# Patient Record
Sex: FEMALE | Race: WHITE
[De-identification: names, ages, dates, MRNs, and addresses within clinical notes are randomized per-mention and may not be internally consistent; named-entity substitution may affect disease eponyms.]

---

## 2017-03-06 ENCOUNTER — APPOINTMENT (OUTPATIENT)
Dept: OPHTHALMOLOGY | Facility: CLINIC | Age: 72
End: 2017-03-06

## 2018-01-08 ENCOUNTER — RESULT REVIEW (OUTPATIENT)
Age: 73
End: 2018-01-08

## 2021-12-13 ENCOUNTER — APPOINTMENT (OUTPATIENT)
Dept: PSYCHIATRY | Facility: CLINIC | Age: 76
End: 2021-12-13
Payer: MEDICARE

## 2021-12-13 PROCEDURE — 99205 OFFICE O/P NEW HI 60 MIN: CPT

## 2021-12-13 RX ORDER — LAMOTRIGINE 200 MG/1
200 TABLET, FILM COATED, EXTENDED RELEASE ORAL
Qty: 90 | Refills: 0 | Status: ACTIVE | COMMUNITY
Start: 2021-06-13

## 2021-12-13 RX ORDER — LEVETIRACETAM 750 MG/1
750 TABLET, FILM COATED, EXTENDED RELEASE ORAL
Qty: 90 | Refills: 0 | Status: ACTIVE | COMMUNITY
Start: 2021-10-21

## 2021-12-13 RX ORDER — ATORVASTATIN CALCIUM 20 MG/1
20 TABLET, FILM COATED ORAL
Qty: 90 | Refills: 0 | Status: ACTIVE | COMMUNITY
Start: 2021-07-27

## 2021-12-13 RX ORDER — AMOXICILLIN 500 MG/1
500 CAPSULE ORAL
Qty: 21 | Refills: 0 | Status: DISCONTINUED | COMMUNITY
Start: 2021-08-02

## 2021-12-14 ENCOUNTER — TRANSCRIPTION ENCOUNTER (OUTPATIENT)
Age: 76
End: 2021-12-14

## 2022-01-03 ENCOUNTER — APPOINTMENT (OUTPATIENT)
Dept: PSYCHIATRY | Facility: CLINIC | Age: 77
End: 2022-01-03
Payer: MEDICARE

## 2022-01-03 PROCEDURE — 99442: CPT | Mod: 95

## 2022-01-24 ENCOUNTER — APPOINTMENT (OUTPATIENT)
Dept: PSYCHIATRY | Facility: CLINIC | Age: 77
End: 2022-01-24
Payer: MEDICARE

## 2022-01-24 PROCEDURE — 99214 OFFICE O/P EST MOD 30 MIN: CPT | Mod: 95

## 2022-03-15 ENCOUNTER — APPOINTMENT (OUTPATIENT)
Dept: PSYCHIATRY | Facility: CLINIC | Age: 77
End: 2022-03-15
Payer: MEDICARE

## 2022-03-15 PROCEDURE — 99214 OFFICE O/P EST MOD 30 MIN: CPT | Mod: 95

## 2022-07-06 ENCOUNTER — APPOINTMENT (OUTPATIENT)
Dept: PSYCHIATRY | Facility: CLINIC | Age: 77
End: 2022-07-06

## 2022-07-06 PROCEDURE — 99214 OFFICE O/P EST MOD 30 MIN: CPT | Mod: 95

## 2022-07-06 RX ORDER — KETOCONAZOLE 20 MG/G
2 CREAM TOPICAL
Qty: 60 | Refills: 0 | Status: DISCONTINUED | COMMUNITY
Start: 2022-03-16

## 2022-07-06 RX ORDER — METOPROLOL SUCCINATE 50 MG/1
50 TABLET, EXTENDED RELEASE ORAL
Qty: 90 | Refills: 0 | Status: ACTIVE | COMMUNITY
Start: 2022-07-02

## 2022-07-06 RX ORDER — HYDROCORTISONE 25 MG/G
2.5 CREAM TOPICAL
Qty: 28 | Refills: 0 | Status: DISCONTINUED | COMMUNITY
Start: 2022-03-16

## 2022-07-06 RX ORDER — TRIAMCINOLONE ACETONIDE 1 MG/G
0.1 CREAM TOPICAL
Qty: 45 | Refills: 0 | Status: DISCONTINUED | COMMUNITY
Start: 2022-05-03

## 2022-10-11 ENCOUNTER — APPOINTMENT (OUTPATIENT)
Dept: PSYCHIATRY | Facility: CLINIC | Age: 77
End: 2022-10-11

## 2022-10-11 PROCEDURE — 99214 OFFICE O/P EST MOD 30 MIN: CPT | Mod: 95

## 2022-11-05 NOTE — PLAN
[No] : No [Medication education provided] : Medication education provided. [FreeTextEntry5] : Psychoeducation and supportive therapy provided, and discussed rationale for recommended meds, continue to monitor for sustained stability of improved symptoms \par sleep hygiene education- no TV or computer/blue screen in the middle of the night, advise get up a try method with listening to soft music or reading a book with task lighting. \par Medication: Continue venlafaxine XR to 75 mg+37.5 mg/day\par Xanax 0.25 mg once a day prn for severe anxiety (states she did not need Xanax since last visit) \par Side effects including but not limited to GI side effects and black box warning of SI in patients younger than 24 were discussed and elevated HBP ate higher doses discussed \par BZD side effects: Discussed with patient adverse effects of longer term/frequent use of BZD, potential to develop tolerance to the dose effects and develop dependence, and potential for addiction. Advise patient not to drive or operate heavy machinery immediately after taking the medication. Also educated patient about safe use/and keeping meds safely, and to not share medications with family/friends. \par MiraLAX daily for constipation, prune juice for constipation\par Educated patient of importance of remaining abstinent from drugs and alcohol. \par Emergency procedures were discussed: pt. educated to call 911 or go to nearest ER for worsening of symptoms/suicidal/homicidal ideation. \par Recommend continuing individual psychotherapy to learn coping skills (outside) \par RTC in 3 months or earlier as needed \par Patient given opportunity to ask questions and their questions were answered and they expressed understanding and agreement with above plan.\par \par I stop checked, no controlled substance Rx in past 12 months: Reference #: [id number]

## 2022-11-05 NOTE — DISCUSSION/SUMMARY
[FreeTextEntry1] : 77 yo woman with anxious predisposition reports sx consistent with diagnosis of generalized anxiety disorder and depressive disorder. She is on Prozac for 10 years, partial response, recently dose further lowered, presents to establish care and review meds. \par \par 1/3/2022: Patient reports no significant improvement or worsening of symptoms on venlafaxine, has history of constipation which she thinks is worse, agrees to take meds for constipation consistently and continue with the trial of venlafaxine, continue to monitor for adverse effects and improvement.\par \par 1/24/2022: Patient reports she feels calmer and has been also noted that then she feels about 75% improvement of symptoms on venlafaxine current dose than when she was taking Prozac.\par \par 3/15/2022: Patient reports sustained improvement of symptoms of depression and anxiety has mild side effects from venlafaxine which she reports are tolerable and could be related to food intake, will continue to monitor and continue with the same dose of medication.\par \par 7/6/2022: Patient reports sustained improvement in symptoms of depression and anxiety, no side effects from venlafaxine reported, continue to monitor for stability of symptoms and continue same medications.\par \par 10/11/2022: Patient and her  report that patient is continuing to do well and they feel that the symptoms of depression and anxiety are in good control on current medications\par \par

## 2022-11-05 NOTE — REASON FOR VISIT
[Patient with collateral] : Patient with collateral  [Spouse] : spouse [FreeTextEntry1] : depression and anxiety

## 2022-11-05 NOTE — HISTORY OF PRESENT ILLNESS
[Home] : at home, [unfilled] , at the time of the visit. [Medical Office: ([facility])___] : at the medical office located in  [Spouse] : spouse [Verbal consent obtained from patient] : the patient, [unfilled] [FreeTextEntry1] : Patient was seen with her  today. \par Patient reported that she is continuing to feel okay with fair control of anxiety and depression symptoms on current medications.  Patient reported that her appetite is okay and desire and motivation to do things is good.  Patient states she has chronic problems with sleep and generally is up at least 2 times in the middle of the night to use the bathroom and as often has trouble falling back to sleep so she plays scrabble on the computer and when she starts feeling drowsy is when she comes in and lays in bed.  Patient reported that she does get adequate hours of sleep although it is interrupted.  Patient reported that she does not feel tired during the day.  Patient denied feeling hopeless or helpless and denied passive or active SI.\par Patient's  corroborated patient's report and reported that he also thinks she is doing well.

## 2022-12-29 ENCOUNTER — OFFICE (OUTPATIENT)
Dept: URBAN - METROPOLITAN AREA CLINIC 109 | Facility: CLINIC | Age: 77
Setting detail: OPHTHALMOLOGY
End: 2022-12-29
Payer: MEDICARE

## 2022-12-29 DIAGNOSIS — H53.16: ICD-10-CM

## 2022-12-29 DIAGNOSIS — H26.493: ICD-10-CM

## 2022-12-29 DIAGNOSIS — H43.393: ICD-10-CM

## 2022-12-29 PROCEDURE — 92014 COMPRE OPH EXAM EST PT 1/>: CPT | Performed by: OPHTHALMOLOGY

## 2022-12-29 ASSESSMENT — REFRACTION_AUTOREFRACTION
OS_AXIS: 76
OD_CYLINDER: -1.00
OS_CYLINDER: -1.00
OD_AXIS: 81
OD_SPHERE: -0.25
OS_SPHERE: 0.00

## 2022-12-29 ASSESSMENT — REFRACTION_MANIFEST
OS_CYLINDER: -0.75
OD_CYLINDER: -0.25
OD_AXIS: 90
OS_VA1: 20/20-
OS_ADD: +2.50
OS_SPHERE: PL
OD_VA1: 20/25+
OS_AXIS: 75
OD_ADD: +2.50
OD_SPHERE: -1.00

## 2022-12-29 ASSESSMENT — REFRACTION_CURRENTRX
OS_OVR_VA: 20/
OD_VPRISM_DIRECTION: PROGS
OD_OVR_VA: 20/
OS_AXIS: 077
OD_SPHERE: -0.50
OD_ADD: +2.50
OS_AXIS: 75
OS_ADD: +2.50
OD_OVR_VA: 20/
OS_SPHERE: -0.25
OS_VPRISM_DIRECTION: PROGS
OS_SPHERE: PL
OS_OVR_VA: 20/
OS_CYLINDER: -0.75
OS_CYLINDER: -0.75
OS_ADD: +2.50
OD_CYLINDER: -0.25
OD_AXIS: 90
OD_AXIS: 095
OD_CYLINDER: -0.25
OD_SPHERE: -0.50
OD_ADD: +2.50

## 2022-12-29 ASSESSMENT — CONFRONTATIONAL VISUAL FIELD TEST (CVF)
OD_FINDINGS: FULL
OS_FINDINGS: FULL

## 2022-12-29 ASSESSMENT — SPHEQUIV_DERIVED
OS_SPHEQUIV: -0.5
OD_SPHEQUIV: -1.125
OD_SPHEQUIV: -0.75

## 2022-12-29 ASSESSMENT — VISUAL ACUITY
OS_BCVA: 20/20-1
OD_BCVA: 20/20

## 2022-12-29 ASSESSMENT — LID POSITION - PTOSIS
OS_PTOSIS: LUL 1+ 2+
OD_PTOSIS: 1+ 2+

## 2023-01-10 ENCOUNTER — APPOINTMENT (OUTPATIENT)
Dept: PSYCHIATRY | Facility: CLINIC | Age: 78
End: 2023-01-10
Payer: MEDICARE

## 2023-01-10 PROCEDURE — 99214 OFFICE O/P EST MOD 30 MIN: CPT | Mod: 95

## 2023-01-16 NOTE — DISCUSSION/SUMMARY
[FreeTextEntry1] : 75 yo woman with anxious predisposition reports sx consistent with diagnosis of generalized anxiety disorder and depressive disorder. She is on Prozac for 10 years, partial response, recently dose further lowered, presents to establish care and review meds. \par \par 1/3/2022: Patient reports no significant improvement or worsening of symptoms on venlafaxine, has history of constipation which she thinks is worse, agrees to take meds for constipation consistently and continue with the trial of venlafaxine, continue to monitor for adverse effects and improvement.\par \par 1/24/2022: Patient reports she feels calmer and has been also noted that then she feels about 75% improvement of symptoms on venlafaxine current dose than when she was taking Prozac.\par \par 3/15/2022: Patient reports sustained improvement of symptoms of depression and anxiety has mild side effects from venlafaxine which she reports are tolerable and could be related to food intake, will continue to monitor and continue with the same dose of medication.\par \par 7/6/2022: Patient reports sustained improvement in symptoms of depression and anxiety, no side effects from venlafaxine reported, continue to monitor for stability of symptoms and continue same medications.\par \par 10/11/2022: Patient and her  report that patient is continuing to do well and they feel that the symptoms of depression and anxiety are in good control on current medications\par \par 1/09/2023: The patient states she still has a few days in a  month that she feels depressed, mostly because she is not able to do things such as volunteer or socialize like before the pandemic, and she did not want to increase the medication dose and feels the current dose is keeping her symptoms in good control.

## 2023-01-16 NOTE — PHYSICAL EXAM
[None] : none [Average] : average [Cooperative] : cooperative [Euthymic] : euthymic [Full] : full [Clear] : clear [Linear/Goal Directed] : linear/goal directed [WNL] : within normal limits [None Reported] : none reported [de-identified] : No SI/HI

## 2023-01-16 NOTE — HISTORY OF PRESENT ILLNESS
[Home] : at home, [unfilled] , at the time of the visit. [Medical Office: ([facility])___] : at the medical office located in  [Verbal consent obtained from patient] : the patient, [unfilled] [FreeTextEntry1] : Patient reported that she was frustrated today, because it took her a while to figure out logging in to the telehealth. She states she still has some days when she is feeling depressed. She attributes this to not having activities that she used to do before the pandemic, such as volunteering at the hospital. She states she enjoyed it and is looking for other opportunities, but has felt disappointed because she has not heard back from some places that she had reached out enquiring about volunteering opportunities. \par Patient reported that her appetite is okay and desire and motivation to do things is good.  Patient states she there is no change in chronic problems with sleep, which is interrupted, but reported that she does get adequate hours of sleep although it is interrupted.  Patient reported that she does not feel tired during the day.  Patient denied feeling hopeless or helpless and denied passive or active SI.\par Patient's  corroborated patient's report and reported that he also thinks she is doing well.

## 2023-01-16 NOTE — PLAN
[No] : No [Medication education provided] : Medication education provided. [FreeTextEntry5] : Psychoeducation and supportive therapy provided, and discussed rationale for recommended meds, continue to monitor for sustained stability of improved symptoms \par sleep hygiene education\par Medication: Continue venlafaxine XR to 75 mg+37.5 mg/day\par Xanax 0.25 mg once a day prn for severe anxiety (states she did not need Xanax since last visit) \par Side effects including but not limited to GI side effects and black box warning of SI in patients younger than 24 were discussed and elevated HBP ate higher doses discussed \par BZD side effects: Discussed with patient adverse effects of longer term/frequent use of BZD, potential to develop tolerance to the dose effects and develop dependence, and potential for addiction. Advise patient not to drive or operate heavy machinery immediately after taking the medication. Also educated patient about safe use/and keeping meds safely, and to not share medications with family/friends. \par MiraLAX daily for constipation, prune juice for constipation\par Educated patient of importance of remaining abstinent from drugs and alcohol. \par Emergency procedures were discussed: pt. educated to call 911 or go to nearest ER for worsening of symptoms/suicidal/homicidal ideation. \par Recommend continuing individual psychotherapy to learn coping skills (outside) \par RTC in 3 months or earlier as needed \par Patient given opportunity to ask questions and their questions were answered and they expressed understanding and agreement with above plan.\par \par I stop checked, no controlled substance Rx in past 12 months: Reference #: [id number]

## 2023-04-10 ENCOUNTER — APPOINTMENT (OUTPATIENT)
Dept: PSYCHIATRY | Facility: CLINIC | Age: 78
End: 2023-04-10
Payer: MEDICARE

## 2023-04-10 PROCEDURE — 99214 OFFICE O/P EST MOD 30 MIN: CPT | Mod: 95

## 2023-04-11 NOTE — DISCUSSION/SUMMARY
[FreeTextEntry1] : 75 yo woman with anxious predisposition reports sx consistent with diagnosis of generalized anxiety disorder and depressive disorder. She is on Prozac for 10 years, partial response, recently dose further lowered, presents to establish care and review meds. \par \par 1/3/2022: Patient reports no significant improvement or worsening of symptoms on venlafaxine, has history of constipation which she thinks is worse, agrees to take meds for constipation consistently and continue with the trial of venlafaxine, continue to monitor for adverse effects and improvement.\par \par 1/24/2022: Patient reports she feels calmer and has been also noted that then she feels about 75% improvement of symptoms on venlafaxine current dose than when she was taking Prozac.\par \par 3/15/2022: Patient reports sustained improvement of symptoms of depression and anxiety has mild side effects from venlafaxine which she reports are tolerable and could be related to food intake, will continue to monitor and continue with the same dose of medication.\par \par 7/6/2022: Patient reports sustained improvement in symptoms of depression and anxiety, no side effects from venlafaxine reported, continue to monitor for stability of symptoms and continue same medications.\par \par 10/11/2022: Patient and her  report that patient is continuing to do well and they feel that the symptoms of depression and anxiety are in good control on current medications\par \par 1/09/2023: The patient states she still has a few days in a  month that she feels depressed, mostly because she is not able to do things such as volunteer or socialize like before the pandemic, and she did not want to increase the medication dose and feels the current dose is keeping her symptoms in good control. \par \par 4/10/2023: Patient remains stable with good control of anxiety and not depressed.

## 2023-04-11 NOTE — HISTORY OF PRESENT ILLNESS
[Home] : at home, [unfilled] , at the time of the visit. [Medical Office: ([facility])___] : at the medical office located in  [Verbal consent obtained from patient] : the patient, [unfilled] [FreeTextEntry1] : Patient reported she is doing okay. She reported sleep remains interrupted and she had home sleep yet, which was inconclusive. She states sporadically she is waking up with excess sweating. She reported Keppra dose was reduced, no seizures in 10 years. She reported not feeling depressed. Anxiety symptoms remain in good control. Patient reported that her appetite is okay and desire and motivation to do things is good.  Patient denied feeling hopeless or helpless and denied passive or active SI.\par Patient's  corroborated patient's report and reported that he also thinks she is doing well.\par Patient states that she would like to try and look for the venlafaxine dose to 75 mg daily.  Patient states she is not experiencing any side effects and feels that that she is doing better on the medications overall but would like to try a lower dose anyway.

## 2023-04-11 NOTE — PLAN
[No] : No [Medication education provided] : Medication education provided. [Rationale for medication choices, possible risks/precautions, benefits, alternative treatment choices, and consequences of non-treatment discussed] : Rationale for medication choices, possible risks/precautions, benefits, alternative treatment choices, and consequences of non-treatment discussed with patient/family/caregiver  [FreeTextEntry5] : Psychoeducation and supportive therapy provided, and discussed rationale for recommended meds, and risk versus benefit of lowering the dose and increased risk of relapse or losing the benefit of the effectiveness at a lower dose.  Patient agreed to continue taking venlafaxine .5 mg daily.\par sleep hygiene education\par Medication: Continue venlafaxine XR to 75 mg+37.5 mg/day\par Xanax 0.25 mg once a day prn for severe anxiety (states she did not need Xanax since last visit) \par Side effects including but not limited to GI side effects and black box warning of SI in patients younger than 24 were discussed and elevated HBP ate higher doses discussed \par BZD side effects: Discussed with patient adverse effects of longer term/frequent use of BZD, potential to develop tolerance to the dose effects and develop dependence, and potential for addiction. Advise patient not to drive or operate heavy machinery immediately after taking the medication. Also educated patient about safe use/and keeping meds safely, and to not share medications with family/friends. \par MiraLAX daily for constipation, prune juice for constipation\par Educated patient of importance of remaining abstinent from drugs and alcohol. \par Emergency procedures were discussed: pt. educated to call 911 or go to nearest ER for worsening of symptoms/suicidal/homicidal ideation. \par Recommend continuing individual psychotherapy to learn coping skills (outside) \par RTC in 3 months or earlier as needed \par Patient not seen for an in person visit by this provider after  12/2/2021 visit. The patient was informed of the need to be seen for in-person visit to avoid issues with getting controlled substance prescription coverage from other providers in the practice after the PHP and on May 11.  Patient has been receiving prescription for alprazolam from her neurologist and at this point she does not need a refill and states that if she is unable to come in for an in-person visit she will continue to receive prescriptions for alprazolam from her neurologist.  \par \par \par Patient given opportunity to ask questions and their questions were answered and they expressed understanding and agreement with above plan.\par \par I stop checked today: Reference #: [id number]\par Rx Written	Rx Dispensed	Drug	Quantity	Days Supply	Prescriber Name	Prescriber Gwendolyn #	Payment Method	Dispenser\par 10/29/2022	11/02/2022	alprazolam 0.25 mg tablet	15	7	Irma Lucia MD	[id number]	Medicare	[id number]

## 2023-04-11 NOTE — PHYSICAL EXAM
[None] : none [Average] : average [Cooperative] : cooperative [Euthymic] : euthymic [Full] : full [Clear] : clear [Linear/Goal Directed] : linear/goal directed [None Reported] : none reported [WNL] : within normal limits [de-identified] : No SI/HI

## 2023-07-07 ENCOUNTER — APPOINTMENT (OUTPATIENT)
Dept: PSYCHIATRY | Facility: CLINIC | Age: 78
End: 2023-07-07
Payer: MEDICARE

## 2023-07-07 PROCEDURE — 99214 OFFICE O/P EST MOD 30 MIN: CPT | Mod: 95

## 2023-07-09 NOTE — DISCUSSION/SUMMARY
[FreeTextEntry1] : 75 yo woman with anxious predisposition reports sx consistent with diagnosis of generalized anxiety disorder and depressive disorder. She is on Prozac for 10 years, partial response, recently dose further lowered, presents to establish care and review meds. \par \par 1/3/2022: Patient reports no significant improvement or worsening of symptoms on venlafaxine, has history of constipation which she thinks is worse, agrees to take meds for constipation consistently and continue with the trial of venlafaxine, continue to monitor for adverse effects and improvement.\par \par 1/24/2022: Patient reports she feels calmer and has been also noted that then she feels about 75% improvement of symptoms on venlafaxine current dose than when she was taking Prozac.\par \par 3/15/2022: Patient reports sustained improvement of symptoms of depression and anxiety has mild side effects from venlafaxine which she reports are tolerable and could be related to food intake, will continue to monitor and continue with the same dose of medication.\par \par 7/6/2022: Patient reports sustained improvement in symptoms of depression and anxiety, no side effects from venlafaxine reported, continue to monitor for stability of symptoms and continue same medications.\par \par 10/11/2022: Patient and her  report that patient is continuing to do well and they feel that the symptoms of depression and anxiety are in good control on current medications\par \par 1/09/2023: The patient states she still has a few days in a  month that she feels depressed, mostly because she is not able to do things such as volunteer or socialize like before the pandemic, and she did not want to increase the medication dose and feels the current dose is keeping her symptoms in good control. \par \par 4/10/2023: Patient remains stable with good control of anxiety and not depressed. \par \par 7/7/2023: \par Patient continues to remain stable with good control of depression and general anxiety symptoms and mild to moderate symptoms of anticipatory anxiety, and short-term memory problems, though mild and not interfering with daily functioning at this time \par

## 2023-07-09 NOTE — PLAN
[No] : No [Medication education provided] : Medication education provided. [Rationale for medication choices, possible risks/precautions, benefits, alternative treatment choices, and consequences of non-treatment discussed] : Rationale for medication choices, possible risks/precautions, benefits, alternative treatment choices, and consequences of non-treatment discussed with patient/family/caregiver  [FreeTextEntry5] : Psychoeducation and supportive therapy provided, and discussed rationale for recommended meds\par Patient and  advised to continue to monitor for short term memory problems and anxiety and depression symptoms. \par Medication: Continue venlafaxine XR to 75 mg+37.5 mg/day\par Xanax 0.25 mg once a day prn for severe anxiety (states she did not need Xanax since last visit) \par Educated patient of importance of remaining abstinent from drugs and alcohol. \par Emergency procedures were discussed: pt. educated to call 911 or go to nearest ER for worsening of symptoms/suicidal/homicidal ideation. \par Recommend continuing individual psychotherapy to learn coping skills (outside) \par RTC in 3 months or earlier as needed \par Patient not seen for an in person visit by this provider after  12/2/2021 visit. The patient was informed of the need to be seen for in-person visit to avoid issues with getting controlled substance prescription coverage from other providers in the practice after the PHP and on May 11.  Patient has been receiving prescription for alprazolam from her neurologist and at this point she does not need a refill and states that if she is unable to come in for an in-person visit she will continue to receive prescriptions for alprazolam from her neurologist.  \par Patient given opportunity to ask questions and their questions were answered and they expressed understanding and agreement with above plan.\par \par I stop checked today:Reference #: [id number]\par \par Practitioner Count: 0\par Pharmacy Count: 0\par Current Opioid Prescriptions: 0\par Current Benzodiazepine Prescriptions: 0\par Current Stimulant Prescriptions: 0\par \par \par Patient Demographic Information (PDI)   \par PDI	First Name	Last Name	Birth Date	Gender	Street Address	[facility]	Zip Code\par A	Justina Orozco	1945	Female	[address]	[address]\par \par Prescription Information\par PDI	My Rx	Current Rx	Drug Type	Rx Written	Rx Dispensed	Drug	Quantity	Days Supply	Prescriber Name	Prescriber KAMALJIT #	Payment Method	Dispenser\par A	N	N	B	10/29/2022	11/02/2022	alprazolam 0.25 mg tablet	15	7	Irma Lucia MD	[id number]	Medicare	[id number]\par

## 2023-07-09 NOTE — PHYSICAL EXAM
[None] : none [Average] : average [Cooperative] : cooperative [Euthymic] : euthymic [Full] : full [Clear] : clear [Linear/Goal Directed] : linear/goal directed [None Reported] : none reported [WNL] : within normal limits [de-identified] : No SI/HI

## 2023-07-09 NOTE — HISTORY OF PRESENT ILLNESS
[FreeTextEntry1] : Patient states she is doing “okay”. She reported her “memory is not so good” and when her  reminds her of it. For example today morning she was getting ready to set up for the Telehealth appointment in a different room, and he reminded her that it was not the usual room she logs in to the visit, but she was insisting that it was and eventually realized that she was not correct.  states he does not point out her forgetfulness, for example when she is mixing up the places and events when she is telling others of the things they have done or attended. Patient states she does not she is having trouble remembering names and words during a conversation which comes back a while after. She states she is not misplacing things and or getting lost.  to corroborated patient report of her memory problems. The patient states she is generally anxious at baseline and she does feel that medication is helping with symptoms of depression dan anxiety in general, however she continues to have anticipatory anxiety before certain events and or medical appointments and sometimes she had needed to take Xanax to help her relax and or sleep better. She states she sees her neurologist yearly. She has not had seizures since last visit and remains seizure free for a while on her antiepileptic med regimen. Patient states she had bedside cognitive test on her last neurologist visit and she was told that she score slightly lower than before that visit. \par

## 2023-07-09 NOTE — REASON FOR VISIT
[Patient with collateral] : Patient with collateral  [Spouse] : spouse [Patient preference] : as per patient preference [Telehealth (audio & video) - Individual/Group] : This visit was provided via telehealth using real-time 2-way audio visual technology. [Medical Office: ([facility])___] : The provider was located at the medical office in [unfilled]. [Home] : The patient, [unfilled], was located at home, [unfilled], at the time of the visit. [Verbal consent obtained from patient/other participant(s)] : Verbal consent for telehealth/telephonic services obtained from patient/other participant(s) [FreeTextEntry1] : depression and anxiety

## 2023-11-17 ENCOUNTER — APPOINTMENT (OUTPATIENT)
Dept: PSYCHIATRY | Facility: CLINIC | Age: 78
End: 2023-11-17
Payer: MEDICARE

## 2023-11-17 PROCEDURE — 99214 OFFICE O/P EST MOD 30 MIN: CPT | Mod: 95

## 2023-12-16 NOTE — PLAN
[FreeTextEntry5] : Psychoeducation and supportive therapy provided, and discussed rationale for recommended meds Patient and  advised to continue to monitor for short term memory problems and anxiety and depression symptoms.  Medication: Continue venlafaxine XR to 75 mg+37.5 mg/day Xanax 0.25 mg once a day prn for severe anxiety (states she did not need Xanax since last visit)  Educated patient of importance of remaining abstinent from drugs and alcohol.  Emergency procedures were discussed: pt. educated to call 911 or go to nearest ER for worsening of symptoms/suicidal/homicidal ideation.  Recommend continuing individual psychotherapy to learn coping skills (outside)  RTC in 3 months or earlier as needed.  Patient not seen for an in person visit by this provider after 12/2/2021 visit. The patient was informed of the need to be seen for in-person visit to avoid issues with getting controlled substance prescription coverage from other providers in the practice after the PHP and on May 11.  Patient has been receiving prescription for alprazolam from her neurologist and at this point she does not need a refill and states that if she is unable to come in for an in-person visit she will continue to receive prescriptions for alprazolam from her neurologist.   Patient given opportunity to ask questions and their questions were answered and they expressed understanding and agreement with above plan.  I stop checked, no controlled substance Rx in past 12 months: Reference #: [id number]

## 2023-12-16 NOTE — HISTORY OF PRESENT ILLNESS
[FreeTextEntry1] : Patient states her summer was "fine".  Patient reported her mood is generally okay.  But when she listens to her thinks about overall situations that "gets to me".  Patient states that she is trying not to consume too much news.  Patient reported that and she does feel down its only for a couple of days and she is able to pull herself out of that mood.  Patient states anxiety is "pretty much under control".  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  Patient reported that she is sleeping okay sometimes cannot fall asleep but then she spends an hour on the computer.  Patient states that she is taking piano lessons and enjoying lowering the play piano.

## 2023-12-16 NOTE — DISCUSSION/SUMMARY
[FreeTextEntry1] : 77 yo woman with anxious predisposition reports sx consistent with diagnosis of generalized anxiety disorder and depressive disorder. She is on Prozac for 10 years, partial response, recently dose further lowered, presents to establish care and review meds.   1/3/2022: Patient reports no significant improvement or worsening of symptoms on venlafaxine, has history of constipation which she thinks is worse, agrees to take meds for constipation consistently and continue with the trial of venlafaxine, continue to monitor for adverse effects and improvement.  1/24/2022: Patient reports she feels calmer and has been also noted that then she feels about 75% improvement of symptoms on venlafaxine current dose than when she was taking Prozac.  3/15/2022: Patient reports sustained improvement of symptoms of depression and anxiety has mild side effects from venlafaxine which she reports are tolerable and could be related to food intake, will continue to monitor and continue with the same dose of medication.  7/6/2022: Patient reports sustained improvement in symptoms of depression and anxiety, no side effects from venlafaxine reported, continue to monitor for stability of symptoms and continue same medications.  10/11/2022: Patient and her  report that patient is continuing to do well and they feel that the symptoms of depression and anxiety are in good control on current medications  1/09/2023: The patient states she still has a few days in a  month that she feels depressed, mostly because she is not able to do things such as volunteer or socialize like before the pandemic, and she did not want to increase the medication dose and feels the current dose is keeping her symptoms in good control.   4/10/2023: Patient remains stable with good control of anxiety and not depressed.   7/7/2023:  Patient continues to remain stable with good control of depression and general anxiety symptoms and mild to moderate symptoms of anticipatory anxiety, and short-term memory problems, though mild and not interfering with daily functioning at this time   11/17/2023: Patient reports symptoms of depression and anxiety continue to remain in good control.

## 2024-01-03 ENCOUNTER — OFFICE (OUTPATIENT)
Dept: URBAN - METROPOLITAN AREA CLINIC 109 | Facility: CLINIC | Age: 79
Setting detail: OPHTHALMOLOGY
End: 2024-01-03
Payer: MEDICARE

## 2024-01-03 DIAGNOSIS — H35.54: ICD-10-CM

## 2024-01-03 DIAGNOSIS — H26.493: ICD-10-CM

## 2024-01-03 DIAGNOSIS — H43.393: ICD-10-CM

## 2024-01-03 PROCEDURE — 92014 COMPRE OPH EXAM EST PT 1/>: CPT | Performed by: OPHTHALMOLOGY

## 2024-01-03 ASSESSMENT — REFRACTION_MANIFEST
OS_AXIS: 75
OD_ADD: +2.50
OD_VA1: 20/25+
OS_ADD: +2.50
OS_SPHERE: PL
OS_VA1: 20/20-
OD_CYLINDER: -0.25
OS_CYLINDER: -0.75
OD_SPHERE: -1.00
OD_AXIS: 90

## 2024-01-03 ASSESSMENT — REFRACTION_CURRENTRX
OS_SPHERE: PLANO
OS_OVR_VA: 20/
OS_CYLINDER: -0.75
OS_AXIS: 077
OD_OVR_VA: 20/
OD_AXIS: 90
OD_CYLINDER: -0.25
OD_ADD: +2.50
OD_VPRISM_DIRECTION: BF
OS_CYLINDER: -0.75
OD_AXIS: 088
OS_ADD: +2.50
OS_VPRISM_DIRECTION: BF
OS_SPHERE: PL
OD_CYLINDER: -0.25
OS_OVR_VA: 20/
OS_ADD: +2.50
OD_SPHERE: -0.50
OS_AXIS: 75
OD_OVR_VA: 20/
OD_SPHERE: -0.50
OD_ADD: +2.50

## 2024-01-03 ASSESSMENT — REFRACTION_AUTOREFRACTION
OS_CYLINDER: -1.00
OD_SPHERE: -0.25
OS_SPHERE: +0.50
OS_AXIS: 085
OD_AXIS: 091
OD_CYLINDER: -0.25

## 2024-01-03 ASSESSMENT — CONFRONTATIONAL VISUAL FIELD TEST (CVF)
OS_FINDINGS: FULL
OD_FINDINGS: FULL

## 2024-01-03 ASSESSMENT — SPHEQUIV_DERIVED
OD_SPHEQUIV: -0.375
OS_SPHEQUIV: 0
OD_SPHEQUIV: -1.125

## 2024-01-03 ASSESSMENT — LID POSITION - PTOSIS
OD_PTOSIS: 1+ 2+
OS_PTOSIS: LUL 1+ 2+

## 2024-02-21 ENCOUNTER — APPOINTMENT (OUTPATIENT)
Dept: PSYCHIATRY | Facility: CLINIC | Age: 79
End: 2024-02-21
Payer: MEDICARE

## 2024-02-21 DIAGNOSIS — F41.1 GENERALIZED ANXIETY DISORDER: ICD-10-CM

## 2024-02-21 DIAGNOSIS — F33.9 MAJOR DEPRESSIVE DISORDER, RECURRENT, UNSPECIFIED: ICD-10-CM

## 2024-02-21 PROCEDURE — 99214 OFFICE O/P EST MOD 30 MIN: CPT

## 2024-05-19 NOTE — HISTORY OF PRESENT ILLNESS
[FreeTextEntry1] : Patient reported she is doing okay since last visit.  Patient reported that she stopped seeing the therapist because she felt that it was not helping.  Patient states that she is able to talk to herself rationalize and the move on and does not feel that she is finding herself to be more anxious since she stopped seeing the therapist.  Patient states that she is experiencing nocturnal sweating and this happens very early in the morning sometimes she is having dreams but other times not but whenever that happens she has to change her night clothes.  Patient states that she feels that this started around the time when she started taking venlafaxine but she also noticed it more in the past few months.  She denied any excessive sweating during the day time.

## 2024-05-19 NOTE — PHYSICAL EXAM
[None] : none [Average] : average [Cooperative] : cooperative [Euthymic] : euthymic [Full] : full [Clear] : clear [Linear/Goal Directed] : linear/goal directed [None Reported] : none reported [WNL] : within normal limits [de-identified] : No SI/HI

## 2024-05-19 NOTE — PLAN
[No] : No [Medication education provided] : Medication education provided. [Rationale for medication choices, possible risks/precautions, benefits, alternative treatment choices, and consequences of non-treatment discussed] : Rationale for medication choices, possible risks/precautions, benefits, alternative treatment choices, and consequences of non-treatment discussed with patient/family/caregiver  [FreeTextEntry5] : Psychoeducation and supportive therapy provided, and discussed rationale for recommended meds Patient and  advised to continue to monitor for short term memory problems and anxiety and depression symptoms.  Medication: Continue venlafaxine XR to 75 mg+37.5 mg/day Xanax 0.25 mg once a day prn for severe anxiety (states she did not need Xanax since last visit)  Educated patient of importance of remaining abstinent from drugs and alcohol.  Emergency procedures were discussed: pt. educated to call 911 or go to nearest ER for worsening of symptoms/suicidal/homicidal ideation.  Recommend continuing individual psychotherapy to learn coping skills (outside)  RTC in 3 months or earlier as needed.  Patient not seen for an in person visit by this provider after 12/2/2021 visit. The patient was informed of the need to be seen for in-person visit to avoid issues with getting controlled substance prescription coverage from other providers in the practice after the PHP and on May 11.  Patient has been receiving prescription for alprazolam from her neurologist and at this point she does not need a refill and states that if she is unable to come in for an in-person visit she will continue to receive prescriptions for alprazolam from her neurologist.   Patient given opportunity to ask questions and their questions were answered and they expressed understanding and agreement with above plan.  I stop checked, no controlled substance Rx in past 12 months: Reference #: [id number]

## 2024-05-19 NOTE — DISCUSSION/SUMMARY
[FreeTextEntry1] : 77 yo woman with anxious predisposition reports sx consistent with diagnosis of generalized anxiety disorder and depressive disorder. She is on Prozac for 10 years, partial response, recently dose further lowered, presents to establish care and review meds.   1/3/2022: Patient reports no significant improvement or worsening of symptoms on venlafaxine, has history of constipation which she thinks is worse, agrees to take meds for constipation consistently and continue with the trial of venlafaxine, continue to monitor for adverse effects and improvement.  1/24/2022: Patient reports she feels calmer and has been also noted that then she feels about 75% improvement of symptoms on venlafaxine current dose than when she was taking Prozac.  3/15/2022: Patient reports sustained improvement of symptoms of depression and anxiety has mild side effects from venlafaxine which she reports are tolerable and could be related to food intake, will continue to monitor and continue with the same dose of medication.  7/6/2022: Patient reports sustained improvement in symptoms of depression and anxiety, no side effects from venlafaxine reported, continue to monitor for stability of symptoms and continue same medications.  10/11/2022: Patient and her  report that patient is continuing to do well and they feel that the symptoms of depression and anxiety are in good control on current medications  1/09/2023: The patient states she still has a few days in a  month that she feels depressed, mostly because she is not able to do things such as volunteer or socialize like before the pandemic, and she did not want to increase the medication dose and feels the current dose is keeping her symptoms in good control.   4/10/2023: Patient remains stable with good control of anxiety and not depressed.   7/7/2023:  Patient continues to remain stable with good control of depression and general anxiety symptoms and mild to moderate symptoms of anticipatory anxiety, and short-term memory problems, though mild and not interfering with daily functioning at this time   11/17/2023: Patient reports symptoms of depression and anxiety continue to remain in good control.  2/21/2024: Patient remains stable with good control of depression and anxiety on current meds.

## 2024-05-19 NOTE — REASON FOR VISIT
[Patient preference] : as per patient preference [Telehealth (audio & video) - Individual/Group] : This visit was provided via telehealth using real-time 2-way audio visual technology. [Medical Office: ([facility])___] : The provider was located at the medical office in [unfilled]. [Home] : The patient, [unfilled], was located at home, [unfilled], at the time of the visit. [Verbal consent obtained from patient/other participant(s)] : Verbal consent for telehealth/telephonic services obtained from patient/other participant(s) [Patient with collateral] : Patient with collateral  [Spouse] : spouse [FreeTextEntry1] : depression and anxiety

## 2024-06-03 RX ORDER — VENLAFAXINE HYDROCHLORIDE 37.5 MG/1
37.5 CAPSULE, EXTENDED RELEASE ORAL
Qty: 30 | Refills: 0 | Status: ACTIVE | COMMUNITY
Start: 2021-12-13 | End: 1900-01-01

## 2024-06-03 RX ORDER — VENLAFAXINE HYDROCHLORIDE 75 MG/1
75 CAPSULE, EXTENDED RELEASE ORAL
Qty: 30 | Refills: 0 | Status: ACTIVE | COMMUNITY
Start: 2022-01-03 | End: 1900-01-01

## 2024-07-03 ENCOUNTER — APPOINTMENT (OUTPATIENT)
Dept: PSYCHIATRY | Facility: CLINIC | Age: 79
End: 2024-07-03
Payer: MEDICARE

## 2024-07-03 DIAGNOSIS — F33.9 MAJOR DEPRESSIVE DISORDER, RECURRENT, UNSPECIFIED: ICD-10-CM

## 2024-07-03 DIAGNOSIS — F41.1 GENERALIZED ANXIETY DISORDER: ICD-10-CM

## 2024-07-03 PROCEDURE — 99214 OFFICE O/P EST MOD 30 MIN: CPT

## 2024-07-03 PROCEDURE — G2211 COMPLEX E/M VISIT ADD ON: CPT

## 2024-09-09 ENCOUNTER — APPOINTMENT (OUTPATIENT)
Dept: PSYCHIATRY | Facility: CLINIC | Age: 79
End: 2024-09-09

## 2024-09-09 NOTE — REASON FOR VISIT
[Patient preference] : as per patient preference [Medical Office: ([facility])___] : The provider was located at the medical office in [unfilled]. [Telehealth (audio & video) - Individual/Group] : This visit was provided via telehealth using real-time 2-way audio visual technology. [Home] : The patient, [unfilled], was located at home, [unfilled], at the time of the visit. [Verbal consent obtained from patient/other participant(s)] : Verbal consent for telehealth/telephonic services obtained from patient/other participant(s) [Patient with collateral] : Patient with collateral  [Spouse] : spouse [FreeTextEntry1] : depression and anxiety

## 2024-09-09 NOTE — PLAN
[No] : No [Medication education provided] : Medication education provided. [Rationale for medication choices, possible risks/precautions, benefits, alternative treatment choices, and consequences of non-treatment discussed] : Rationale for medication choices, possible risks/precautions, benefits, alternative treatment choices, and consequences of non-treatment discussed with patient/family/caregiver  [FreeTextEntry5] : Psychoeducation and supportive therapy provided, and discussed rationale for recommended meds Patient and  advised to continue to monitor for short term memory problems and anxiety and depression symptoms.  Medication: Continue venlafaxine XR to 75 mg+37.5 mg/day Xanax 0.25 mg once a day prn for severe anxiety (states she did not need Xanax since last visit)  Educated patient of importance of remaining abstinent from drugs and alcohol.  Emergency procedures were discussed: pt. educated to call 911 or go to nearest ER for worsening of symptoms/suicidal/homicidal ideation.  Recommend continuing individual psychotherapy to learn coping skills (outside)  RTC in 3 months or earlier as needed.  Patient not seen for an in person visit by this provider after 12/2/2021 visit. The patient was informed of the need to be seen for in-person visit to avoid issues with getting controlled substance prescription coverage from other providers in the practice after the PHP and on May 11.  Patient has been receiving prescription for alprazolam from her neurologist and at this point she does not need a refill and states that if she is unable to come in for an in-person visit she will continue to receive prescriptions for alprazolam from her neurologist.   Patient given opportunity to ask questions and their questions were answered and they expressed understanding and agreement with above plan.  I stop checked, no controlled substance Rx in past 12 months:  Reference #: [id number]

## 2024-09-09 NOTE — HISTORY OF PRESENT ILLNESS
[FreeTextEntry1] : Patient reported she is doing okay since last visit.  Patient reported that she started seeing therapist again, every other week.  She states Keppra was tapered and stopped.  She reported depression and anxiety symptoms are in good control. She states she still has interrupted sleep, but able to fall back to sleep.  She reported no longer experiencing night sweats.  She states she was at her daughter's house and at night when she heard a noise she went to check, slipped and fell down the stairs, got evaluated, needed a couple of stitches, denied TBI.

## 2024-09-09 NOTE — REASON FOR VISIT
[Patient preference] : as per patient preference [Telehealth (audio & video) - Individual/Group] : This visit was provided via telehealth using real-time 2-way audio visual technology. [Medical Office: ([facility])___] : The provider was located at the medical office in [unfilled]. [Home] : The patient, [unfilled], was located at home, [unfilled], at the time of the visit. [Verbal consent obtained from patient/other participant(s)] : Verbal consent for telehealth/telephonic services obtained from patient/other participant(s) [Patient with collateral] : Patient with collateral  [Spouse] : spouse [FreeTextEntry1] : depression and anxiety

## 2024-09-09 NOTE — DISCUSSION/SUMMARY
[FreeTextEntry1] : 77 yo woman with anxious predisposition reports sx consistent with diagnosis of generalized anxiety disorder and depressive disorder. She is on Prozac for 10 years, partial response, recently dose further lowered, presents to establish care and review meds.   1/3/2022: Patient reports no significant improvement or worsening of symptoms on venlafaxine, has history of constipation which she thinks is worse, agrees to take meds for constipation consistently and continue with the trial of venlafaxine, continue to monitor for adverse effects and improvement.  1/24/2022: Patient reports she feels calmer and has been also noted that then she feels about 75% improvement of symptoms on venlafaxine current dose than when she was taking Prozac.  3/15/2022: Patient reports sustained improvement of symptoms of depression and anxiety has mild side effects from venlafaxine which she reports are tolerable and could be related to food intake, will continue to monitor and continue with the same dose of medication.  7/6/2022: Patient reports sustained improvement in symptoms of depression and anxiety, no side effects from venlafaxine reported, continue to monitor for stability of symptoms and continue same medications.  10/11/2022: Patient and her  report that patient is continuing to do well and they feel that the symptoms of depression and anxiety are in good control on current medications  1/09/2023: The patient states she still has a few days in a  month that she feels depressed, mostly because she is not able to do things such as volunteer or socialize like before the pandemic, and she did not want to increase the medication dose and feels the current dose is keeping her symptoms in good control.   4/10/2023: Patient remains stable with good control of anxiety and not depressed.   7/7/2023:  Patient continues to remain stable with good control of depression and general anxiety symptoms and mild to moderate symptoms of anticipatory anxiety, and short-term memory problems, though mild and not interfering with daily functioning at this time   11/17/2023: Patient reports symptoms of depression and anxiety continue to remain in good control.  2/21/2024: Patient remains stable with good control of depression and anxiety on current meds.   7/3/2024: Patient continues to remain stable with good control of depression and anxiety on current meds.   09/09/2024:

## 2024-09-09 NOTE — PLAN
[No] : No [Medication education provided] : Medication education provided. [Rationale for medication choices, possible risks/precautions, benefits, alternative treatment choices, and consequences of non-treatment discussed] : Rationale for medication choices, possible risks/precautions, benefits, alternative treatment choices, and consequences of non-treatment discussed with patient/family/caregiver  [FreeTextEntry5] : Psychoeducation and supportive therapy provided, and discussed rationale for recommended meds Patient and  advised to continue to monitor for short term memory problems and anxiety and depression symptoms.  Medication: Continue venlafaxine XR to 75 mg+37.5 mg/day Xanax 0.25 mg once a day prn for severe anxiety (states she did not need Xanax since last visit)  Educated patient of importance of remaining abstinent from drugs and alcohol.  Emergency procedures were discussed: pt. educated to call 911 or go to nearest ER for worsening of symptoms/suicidal/homicidal ideation.  Recommend continuing individual psychotherapy to learn coping skills (outside)  RTC in 3 months or earlier as needed.  Patient not seen for an in person visit by this provider after 12/2/2021 visit. The patient was informed of the need to be seen for in-person visit to avoid issues with getting controlled substance prescription coverage from other providers in the practice after the PHP and on May 11.  Patient has been receiving prescription for alprazolam from her neurologist and at this point she does not need a refill and states that if she is unable to come in for an in-person visit she will continue to receive prescriptions for alprazolam from her neurologist.   Patient given opportunity to ask questions and their questions were answered and they expressed understanding and agreement with above plan.  I stop checked, no controlled substance Rx in past 12 months:  Reference #: [id number]

## 2024-09-10 ENCOUNTER — TRANSCRIPTION ENCOUNTER (OUTPATIENT)
Age: 79
End: 2024-09-10

## 2024-10-09 ENCOUNTER — APPOINTMENT (OUTPATIENT)
Dept: PSYCHIATRY | Facility: CLINIC | Age: 79
End: 2024-10-09

## 2024-10-10 ENCOUNTER — OFFICE (OUTPATIENT)
Dept: URBAN - METROPOLITAN AREA CLINIC 109 | Facility: CLINIC | Age: 79
Setting detail: OPHTHALMOLOGY
End: 2024-10-10
Payer: MEDICARE

## 2024-10-10 DIAGNOSIS — H11.31: ICD-10-CM

## 2024-10-10 DIAGNOSIS — S09.90XA: ICD-10-CM

## 2024-10-10 DIAGNOSIS — H35.54: ICD-10-CM

## 2024-10-10 DIAGNOSIS — H26.493: ICD-10-CM

## 2024-10-10 PROCEDURE — 99213 OFFICE O/P EST LOW 20 MIN: CPT | Performed by: OPHTHALMOLOGY

## 2024-10-10 ASSESSMENT — REFRACTION_AUTOREFRACTION
OS_SPHERE: +0.50
OD_SPHERE: -0.25
OD_AXIS: 091
OS_CYLINDER: -1.00
OD_CYLINDER: -0.25
OS_AXIS: 085

## 2024-10-10 ASSESSMENT — REFRACTION_CURRENTRX
OD_VPRISM_DIRECTION: BF
OD_CYLINDER: -0.25
OD_OVR_VA: 20/
OS_AXIS: 75
OS_VPRISM_DIRECTION: BF
OD_AXIS: 90
OS_SPHERE: PLANO
OD_SPHERE: -0.50
OD_ADD: +2.50
OS_SPHERE: PL
OD_CYLINDER: -0.25
OD_AXIS: 088
OS_CYLINDER: -0.75
OS_ADD: +2.50
OD_OVR_VA: 20/
OS_CYLINDER: -0.75
OS_OVR_VA: 20/
OS_OVR_VA: 20/
OD_ADD: +2.50
OD_SPHERE: -0.50
OS_ADD: +2.50
OS_AXIS: 077

## 2024-10-10 ASSESSMENT — LID POSITION - PTOSIS
OD_PTOSIS: 1+ 2+
OS_PTOSIS: LUL 1+ 2+

## 2024-10-10 ASSESSMENT — TONOMETRY
OS_IOP_MMHG: 15
OD_IOP_MMHG: 15

## 2024-10-10 ASSESSMENT — VISUAL ACUITY
OD_BCVA: 20/20-2
OS_BCVA: 20/20-1

## 2024-10-10 ASSESSMENT — PACHYMETRY
OD_CT_UM: 507
OS_CT_CORRECTION: 3
OS_CT_UM: 509
OD_CT_CORRECTION: 3

## 2024-10-10 ASSESSMENT — REFRACTION_MANIFEST
OS_SPHERE: PL
OS_ADD: +2.50
OS_AXIS: 75
OS_VA1: 20/20-
OD_VA1: 20/25+
OD_AXIS: 90
OD_SPHERE: -1.00
OS_CYLINDER: -0.75
OD_CYLINDER: -0.25
OD_ADD: +2.50

## 2024-10-10 ASSESSMENT — CONFRONTATIONAL VISUAL FIELD TEST (CVF)
OD_FINDINGS: FULL
OS_FINDINGS: FULL

## 2024-11-13 ENCOUNTER — APPOINTMENT (OUTPATIENT)
Dept: PSYCHIATRY | Facility: CLINIC | Age: 79
End: 2024-11-13
Payer: MEDICARE

## 2024-11-13 ENCOUNTER — OFFICE (OUTPATIENT)
Dept: URBAN - METROPOLITAN AREA CLINIC 109 | Facility: CLINIC | Age: 79
Setting detail: OPHTHALMOLOGY
End: 2024-11-13
Payer: MEDICARE

## 2024-11-13 ENCOUNTER — APPOINTMENT (OUTPATIENT)
Dept: ORTHOPEDIC SURGERY | Facility: CLINIC | Age: 79
End: 2024-11-13
Payer: MEDICARE

## 2024-11-13 DIAGNOSIS — F41.1 GENERALIZED ANXIETY DISORDER: ICD-10-CM

## 2024-11-13 DIAGNOSIS — M18.12 UNILATERAL PRIMARY OSTEOARTHRITIS OF FIRST CARPOMETACARPAL JOINT, LEFT HAND: ICD-10-CM

## 2024-11-13 DIAGNOSIS — H35.54: ICD-10-CM

## 2024-11-13 DIAGNOSIS — H26.493: ICD-10-CM

## 2024-11-13 DIAGNOSIS — F33.9 MAJOR DEPRESSIVE DISORDER, RECURRENT, UNSPECIFIED: ICD-10-CM

## 2024-11-13 DIAGNOSIS — H11.31: ICD-10-CM

## 2024-11-13 DIAGNOSIS — M65.4 RADIAL STYLOID TENOSYNOVITIS [DE QUERVAIN]: ICD-10-CM

## 2024-11-13 DIAGNOSIS — S09.90XA: ICD-10-CM

## 2024-11-13 PROCEDURE — 99214 OFFICE O/P EST MOD 30 MIN: CPT

## 2024-11-13 PROCEDURE — G2211 COMPLEX E/M VISIT ADD ON: CPT

## 2024-11-13 PROCEDURE — 99213 OFFICE O/P EST LOW 20 MIN: CPT | Performed by: OPHTHALMOLOGY

## 2024-11-13 PROCEDURE — 99203 OFFICE O/P NEW LOW 30 MIN: CPT

## 2024-11-13 ASSESSMENT — REFRACTION_MANIFEST
OD_ADD: +2.50
OS_AXIS: 75
OS_SPHERE: PL
OD_CYLINDER: -0.25
OD_SPHERE: -1.00
OD_AXIS: 90
OS_CYLINDER: -0.75
OS_ADD: +2.50
OD_VA1: 20/25+
OS_VA1: 20/20-

## 2024-11-13 ASSESSMENT — PACHYMETRY
OS_CT_UM: 509
OD_CT_UM: 507
OS_CT_CORRECTION: 3
OD_CT_CORRECTION: 3

## 2024-11-13 ASSESSMENT — REFRACTION_AUTOREFRACTION
OS_CYLINDER: -1.00
OS_SPHERE: PLANO
OS_AXIS: 091
OD_SPHERE: -0.75
OD_AXIS: 078
OD_CYLINDER: -0.50

## 2024-11-13 ASSESSMENT — KERATOMETRY
OS_AXISANGLE_DEGREES: 170
OD_K2POWER_DIOPTERS: 44.00
OS_K1POWER_DIOPTERS: 42.75
OD_K1POWER_DIOPTERS: 43.00
OS_K2POWER_DIOPTERS: 43.00
OD_AXISANGLE_DEGREES: 180

## 2024-11-13 ASSESSMENT — REFRACTION_CURRENTRX
OD_AXIS: 90
OS_ADD: +2.50
OD_CYLINDER: -0.25
OD_OVR_VA: 20/
OD_ADD: +2.50
OS_VPRISM_DIRECTION: BF
OS_OVR_VA: 20/
OS_AXIS: 75
OS_OVR_VA: 20/
OS_SPHERE: PLANO
OD_AXIS: 088
OS_AXIS: 077
OD_SPHERE: -0.50
OS_ADD: +2.50
OS_CYLINDER: -0.75
OD_CYLINDER: -0.25
OD_VPRISM_DIRECTION: BF
OS_CYLINDER: -0.75
OD_OVR_VA: 20/
OS_SPHERE: PL
OD_ADD: +2.50
OD_SPHERE: -0.50

## 2024-11-13 ASSESSMENT — LID POSITION - PTOSIS
OS_PTOSIS: LUL 1+ 2+
OD_PTOSIS: 1+ 2+

## 2024-11-13 ASSESSMENT — CONFRONTATIONAL VISUAL FIELD TEST (CVF)
OS_FINDINGS: FULL
OD_FINDINGS: FULL

## 2024-11-13 ASSESSMENT — TONOMETRY
OD_IOP_MMHG: 14
OS_IOP_MMHG: 11

## 2024-11-13 ASSESSMENT — VISUAL ACUITY
OS_BCVA: 20/20-
OD_BCVA: 20/20-2

## 2025-02-12 ENCOUNTER — APPOINTMENT (OUTPATIENT)
Dept: PSYCHIATRY | Facility: CLINIC | Age: 80
End: 2025-02-12

## 2025-02-12 DIAGNOSIS — F33.9 MAJOR DEPRESSIVE DISORDER, RECURRENT, UNSPECIFIED: ICD-10-CM

## 2025-02-12 DIAGNOSIS — F41.1 GENERALIZED ANXIETY DISORDER: ICD-10-CM

## 2025-02-12 PROCEDURE — G2211 COMPLEX E/M VISIT ADD ON: CPT | Mod: 2W

## 2025-02-12 PROCEDURE — 99214 OFFICE O/P EST MOD 30 MIN: CPT | Mod: 2W

## 2025-05-28 ENCOUNTER — APPOINTMENT (OUTPATIENT)
Dept: PSYCHIATRY | Facility: CLINIC | Age: 80
End: 2025-05-28
Payer: MEDICARE

## 2025-05-28 DIAGNOSIS — F33.9 MAJOR DEPRESSIVE DISORDER, RECURRENT, UNSPECIFIED: ICD-10-CM

## 2025-05-28 DIAGNOSIS — F41.1 GENERALIZED ANXIETY DISORDER: ICD-10-CM

## 2025-05-28 PROCEDURE — G2211 COMPLEX E/M VISIT ADD ON: CPT | Mod: 2W

## 2025-05-28 PROCEDURE — 99214 OFFICE O/P EST MOD 30 MIN: CPT | Mod: 2W

## 2025-08-27 ENCOUNTER — APPOINTMENT (OUTPATIENT)
Dept: PSYCHIATRY | Facility: CLINIC | Age: 80
End: 2025-08-27

## 2025-08-27 DIAGNOSIS — F41.1 GENERALIZED ANXIETY DISORDER: ICD-10-CM

## 2025-08-27 DIAGNOSIS — F33.9 MAJOR DEPRESSIVE DISORDER, RECURRENT, UNSPECIFIED: ICD-10-CM

## 2025-08-27 PROCEDURE — 99214 OFFICE O/P EST MOD 30 MIN: CPT | Mod: 2W

## 2025-08-27 PROCEDURE — G2211 COMPLEX E/M VISIT ADD ON: CPT | Mod: 2W
